# Patient Record
Sex: MALE | Race: WHITE
[De-identification: names, ages, dates, MRNs, and addresses within clinical notes are randomized per-mention and may not be internally consistent; named-entity substitution may affect disease eponyms.]

---

## 2020-02-06 ENCOUNTER — HOSPITAL ENCOUNTER (OUTPATIENT)
Dept: HOSPITAL 92 - SCSMRI | Age: 50
Discharge: HOME | End: 2020-02-06
Attending: ORTHOPAEDIC SURGERY
Payer: COMMERCIAL

## 2020-02-06 DIAGNOSIS — M17.11: ICD-10-CM

## 2020-02-06 DIAGNOSIS — S83.241A: ICD-10-CM

## 2020-02-06 DIAGNOSIS — M25.561: Primary | ICD-10-CM

## 2020-02-06 NOTE — MRI
MRI OF RIGHT KNEE PERFORMED WITHOUT CONTRAST ENHANCEMENT:

 

Date:  02/06/2020

 

HISTORY:  

Right knee pain, injured knee a couple of months ago. 

 

FINDINGS:

There is some mild increased signal change within the ACL which could indicate some early mucoid dege
neration. Fibers do appear intact. Posterior cruciate ligament is intact. 

 

There is a complex tear of the posterior horn and body region of the medial meniscus. Tear begins ravi
r the meniscal root, more as an undersurface flap-type tear. There is fairly prominent degeneration t
o the body of the meniscus. Some subluxation of the meniscal tissue in the meniscal tibial recess. 

 

Lateral meniscus is normal in shape and appearance. 

 

The medial and lateral collateral ligaments, and iliotibial band regions are normal. 

 

Patellar articular cartilage has some Grade IV chondromalacia change of the medial facet. The medial 
and lateral patellar retinaculum, and quadriceps and patellar tendons are normal. 

 

IMPRESSION: 

1.  Posterior horn and body medial meniscus tear which appears to have more of a flap-type orientatio
n. 

 

2.  Arthritic changes of the patellofemoral joint. 

 

 

POS: [facility]